# Patient Record
Sex: FEMALE | Race: WHITE
[De-identification: names, ages, dates, MRNs, and addresses within clinical notes are randomized per-mention and may not be internally consistent; named-entity substitution may affect disease eponyms.]

---

## 2017-06-20 ENCOUNTER — HOSPITAL ENCOUNTER (OUTPATIENT)
Dept: HOSPITAL 62 - END | Age: 72
Discharge: HOME | End: 2017-06-20
Attending: SURGERY
Payer: MEDICARE

## 2017-06-20 VITALS — SYSTOLIC BLOOD PRESSURE: 119 MMHG | DIASTOLIC BLOOD PRESSURE: 71 MMHG

## 2017-06-20 DIAGNOSIS — M54.81: ICD-10-CM

## 2017-06-20 DIAGNOSIS — E03.9: ICD-10-CM

## 2017-06-20 DIAGNOSIS — R10.11: ICD-10-CM

## 2017-06-20 DIAGNOSIS — M19.90: ICD-10-CM

## 2017-06-20 DIAGNOSIS — K44.9: ICD-10-CM

## 2017-06-20 DIAGNOSIS — Z79.899: ICD-10-CM

## 2017-06-20 DIAGNOSIS — K21.9: Primary | ICD-10-CM

## 2017-06-20 LAB
ALBUMIN SERPL-MCNC: 4.3 G/DL (ref 3.5–5)
ALP SERPL-CCNC: 106 U/L (ref 38–126)
ALT SERPL-CCNC: 33 U/L (ref 9–52)
ANION GAP SERPL CALC-SCNC: 11 MMOL/L (ref 5–19)
AST SERPL-CCNC: 22 U/L (ref 14–36)
BILIRUB DIRECT SERPL-MCNC: 0.3 MG/DL (ref 0–0.4)
BILIRUB SERPL-MCNC: 0.7 MG/DL (ref 0.2–1.3)
BUN SERPL-MCNC: 13 MG/DL (ref 7–20)
CALCIUM: 9.7 MG/DL (ref 8.4–10.2)
CHLORIDE SERPL-SCNC: 107 MMOL/L (ref 98–107)
CO2 SERPL-SCNC: 23 MMOL/L (ref 22–30)
CREAT SERPL-MCNC: 0.95 MG/DL (ref 0.52–1.25)
ERYTHROCYTE [DISTWIDTH] IN BLOOD BY AUTOMATED COUNT: 13.4 % (ref 11.5–14)
GLUCOSE SERPL-MCNC: 95 MG/DL (ref 75–110)
HCT VFR BLD CALC: 43.1 % (ref 36–47)
HGB BLD-MCNC: 13.7 G/DL (ref 12–15.5)
HGB HCT DIFFERENCE: -2
MCH RBC QN AUTO: 27.8 PG (ref 27–33.4)
MCHC RBC AUTO-ENTMCNC: 31.8 G/DL (ref 32–36)
MCV RBC AUTO: 87 FL (ref 80–97)
POTASSIUM SERPL-SCNC: 4.4 MMOL/L (ref 3.6–5)
PROT SERPL-MCNC: 7.2 G/DL (ref 6.3–8.2)
RBC # BLD AUTO: 4.94 10^6/UL (ref 3.72–5.28)
SODIUM SERPL-SCNC: 141 MMOL/L (ref 137–145)
WBC # BLD AUTO: 6.2 10^3/UL (ref 4–10.5)

## 2017-06-20 PROCEDURE — 36415 COLL VENOUS BLD VENIPUNCTURE: CPT

## 2017-06-20 PROCEDURE — 85027 COMPLETE CBC AUTOMATED: CPT

## 2017-06-20 PROCEDURE — 43235 EGD DIAGNOSTIC BRUSH WASH: CPT

## 2017-06-20 PROCEDURE — 0DJ08ZZ INSPECTION OF UPPER INTESTINAL TRACT, VIA NATURAL OR ARTIFICIAL OPENING ENDOSCOPIC: ICD-10-PCS | Performed by: SURGERY

## 2017-06-20 PROCEDURE — 80053 COMPREHEN METABOLIC PANEL: CPT

## 2017-06-20 RX ADMIN — MIDAZOLAM HYDROCHLORIDE ONE MG: 1 INJECTION, SOLUTION INTRAMUSCULAR; INTRAVENOUS at 10:55

## 2017-06-20 RX ADMIN — MIDAZOLAM HYDROCHLORIDE ONE MG: 1 INJECTION, SOLUTION INTRAMUSCULAR; INTRAVENOUS at 10:59

## 2017-06-20 NOTE — PDOC DISCHARGE SUMMARY
Discharge Summary (SDC)





- Discharge


Final Diagnosis: 


Abdominal pain.  Hiatal hernia


Date of Surgery: 06/20/17


Discharge Date: 06/20/17


Condition: Good


Treatment or Instructions: 


Esophagogastroduodenoscopy.  May discharge patient home when met discharge 

criteria.  Follow-up with me in 1-2 weeks.


Discharge Diet: As Tolerated


Discharge Activity: Activity As Tolerated


Report the Following to Your Physician Immediately: Unusual Bleeding

## 2017-06-20 NOTE — OPERATIVE REPORT
Operative Report


DATE OF SURGERY: 06/20/17


PREOPERATIVE DIAGNOSIS: Abdominal pain.  Gastroesophageal reflux disease.


POSTOPERATIVE DIAGNOSIS: Abdominal pain.  Hiatal hernia.


OPERATION: Esophagogastroduodenoscopy.


SURGEON: JOANN YOO


ANESTHESIA: Moderate Sedation


TISSUE REMOVED OR ALTERED: None


COMPLICATIONS: 


None


ESTIMATED BLOOD LOSS: None


INTRAOPERATIVE FINDINGS: Moderate sized hiatal hernia.  No ulcers no erosions 

no strictures


PROCEDURE: 


Informed consent was obtained.  Patient was brought to the endoscopy suite.  IV 

sedation with Versed and fentanyl was administered.  Endoscope was passed via 

the patient's mouth into the second portion of the duodenum.  Duodenum appeared 

to be normal.  The gastric mucosa appeared to be normal.  Patient had a 

moderate sized hiatal hernia.  No ulcers no erosions no masses were identified.

  The esophagus appeared normal.  Patient tolerated procedure well with no 

apparent complications.





Of note her preoperative CBC and compress the metabolic panel was unremarkable.

  I will see her back for follow-up to review her ultrasound report and 

discussed the management of her reflux symptoms.

## 2018-05-07 ENCOUNTER — HOSPITAL ENCOUNTER (OUTPATIENT)
Dept: HOSPITAL 62 - WI | Age: 73
End: 2018-05-07
Attending: INTERNAL MEDICINE
Payer: MEDICARE

## 2018-05-07 DIAGNOSIS — M81.0: ICD-10-CM

## 2018-05-07 DIAGNOSIS — Z12.31: Primary | ICD-10-CM

## 2018-05-07 PROCEDURE — 77067 SCR MAMMO BI INCL CAD: CPT

## 2018-05-07 PROCEDURE — 77080 DXA BONE DENSITY AXIAL: CPT

## 2018-05-07 PROCEDURE — 77063 BREAST TOMOSYNTHESIS BI: CPT

## 2018-05-09 NOTE — WOMENS IMAGING REPORT
EXAM DESCRIPTION:  3D SCREENING MAMMO BILAT



COMPLETED DATE/TIME:  5/7/2018 10:03 am



REASON FOR STUDY:  SCREENING MAMMO Z12.31  ENCNTR SCREEN MAMMOGRAM FOR MALIGNANT NEOPLASM OF ANDREA M81.
0  AGE-RELATED OSTEOPOROSIS W/O CURRENT PATHOLOGICAL FRAC



COMPARISON:  2015, 2016



TECHNIQUE:  Standard craniocaudal and mediolateral oblique views of each breast recorded using digita
l acquisition and breast tomosynthesis.



LIMITATIONS:  None.



FINDINGS:  No masses, calcifications or architectural distortion. No areas of suspicion.

Read with the assistance of CAD.

.[facility] - R2 Cenova Version 1.3

.[facility] - R2 Cenova Version 1.3

.[facility] - R2 Cenova Version 2.4

.[facility] - R2 Cenova Version 2.4

.[facility] - R2  Version 9.2



IMPRESSION:  NORMAL MAMMOGRAM.  BIRADS 1.



BREAST DENSITY:  a. The breasts are almost entirely fatty.



BIRAD:  1 NEGATIVE



RECOMMENDATION:  ROUTINE SCREENING



COMMENT:  The patient has been notified of the results by letter per MQSA requirements. Additional no
tification policies are in place for contacting patient with suspicious or incomplete findings.

Quality ID #225: The American College of Radiology recommends an annual screening mammogram for women
 aged 40 years or over. This facility utilizes a reminder system to ensure that all patients receive 
reminder letters, and/or direct phone calls for appointments. This includes reminders for routine scr
eening mammograms, diagnostic mammograms, or other Breast Imaging Interventions when appropriate.  Th
is patient will be placed in the appropriate reminder system.

The American College of Radiology (ACR) has developed recommendations for screening MRI of the breast
s in certain patient populations, to be used in conjunction with mammography.  Breast MRI surveillanc
e may be appropriate for women with more than 20% lifetime risk of developing breast cancer  as deter
mined by genetic testing, significant family history of the disease, or history of mantle radiation f
or Hodgkins Disease.  ACR Practice Guidelines 2008.

[employer]



PQRS 6045F: Fluoroscopic imaging is not utilized for breast tomosynthesis.



TECHNICAL DOCUMENTATION:  FINDING NUMBER: (1)

ASSESSMENT:  (1)

JOB ID:  [id number]

 2011 [employer]- All Rights Reserved



Reading location - IP/workstation name: [email or web address]

## 2018-05-09 NOTE — WOMENS IMAGING REPORT
EXAM DESCRIPTION:  BONE DENSITY HIP/SPINE



COMPLETED DATE/TIME:  5/7/2018 10:03 am



REASON FOR STUDY:  OSTEOPOROSIS Z12.31  ENCNTR SCREEN MAMMOGRAM FOR MALIGNANT NEOPLASM OF ANDREA M81.0  
AGE-RELATED OSTEOPOROSIS W/O CURRENT PATHOLOGICAL FRAC



COMPARISON:   4/29/2015



TECHNIQUE:  Dual-Energy X-ray Absorptiometry (DEXA) of the AP Spine and Hip.



LIMITATIONS:  None.



FINDINGS:  LUMBAR SPINE:

The bone mineral density (BMD) measured from L1-L4 in the AP projection correlates with a T-score of 
-0.9, previously -0.5, which is normal as defined by the World Health Organization.

HIP:

The bone mineral density (BMD) measured in the left hip correlates with a T-score of -1.7, previously
 -1.5, which is osteopenia as defined by the World Health Organization.



IMPRESSION:  1. LUMBAR SPINE: NORMAL.

2.  HIP: OSTEOPENIA.



COMMENT:  The World Health Organization defines low BMD as follows:

T-score:

Normal:  Greater than -1.0

Osteopenia: Between -1.0 and -2.5

Osteoporosis:  Less than -2.5 without fractures

Established osteoporosis:  Less than -2.5 with fractures

In general, you may wish to consider:

Diagnosis          Treatment                     Follow-up DEXA

Normal BMD      Prevention                    2-3 years

Osteopenia       Prevention/Therapy        1-2 years

Osteoporosis     Therapy                        Yearly



TECHNICAL DOCUMENTATION:  JOB ID:  [id number]

 2011 [employer]- All Rights Reserved



Reading location - IP/workstation name: [email or web address]

## 2018-06-19 ENCOUNTER — HOSPITAL ENCOUNTER (OUTPATIENT)
Dept: HOSPITAL 62 - SC | Age: 73
Discharge: HOME | End: 2018-06-19
Attending: OPHTHALMOLOGY
Payer: MEDICARE

## 2018-06-19 DIAGNOSIS — H25.13: Primary | ICD-10-CM

## 2018-06-19 DIAGNOSIS — M19.90: ICD-10-CM

## 2018-06-19 DIAGNOSIS — Z79.899: ICD-10-CM

## 2018-06-19 DIAGNOSIS — Z88.0: ICD-10-CM

## 2018-06-19 DIAGNOSIS — E03.9: ICD-10-CM

## 2018-06-19 DIAGNOSIS — H04.123: ICD-10-CM

## 2018-06-19 DIAGNOSIS — R53.1: ICD-10-CM

## 2018-06-19 DIAGNOSIS — Z88.8: ICD-10-CM

## 2018-06-19 DIAGNOSIS — K21.9: ICD-10-CM

## 2018-06-19 DIAGNOSIS — G89.29: ICD-10-CM

## 2018-06-19 DIAGNOSIS — H40.033: ICD-10-CM

## 2018-06-19 PROCEDURE — V2787 ASTIGMATISM-CORRECT FUNCTION: HCPCS

## 2018-06-19 PROCEDURE — 66984 XCAPSL CTRC RMVL W/O ECP: CPT

## 2018-06-19 RX ADMIN — KETOROLAC TROMETHAMINE PRN DROP: 4.5 SOLUTION/ DROPS OPHTHALMIC at 13:23

## 2018-06-19 RX ADMIN — TROPICAMIDE PRN DROP: 10 SOLUTION/ DROPS OPHTHALMIC at 11:57

## 2018-06-19 RX ADMIN — LIDOCAINE HYDROCHLORIDE PRN ML: 40 INJECTION, SOLUTION RETROBULBAR; TOPICAL at 00:00

## 2018-06-19 RX ADMIN — TROPICAMIDE PRN DROP: 10 SOLUTION/ DROPS OPHTHALMIC at 11:48

## 2018-06-19 RX ADMIN — BESIFLOXACIN PRN DROP: 6 SUSPENSION OPHTHALMIC at 12:39

## 2018-06-19 RX ADMIN — EPINEPHRINE ONE MG: 1 INJECTION, SOLUTION, CONCENTRATE INTRAVENOUS at 12:24

## 2018-06-19 RX ADMIN — BESIFLOXACIN PRN DROP: 6 SUSPENSION OPHTHALMIC at 11:57

## 2018-06-19 RX ADMIN — BUPIVACAINE HYDROCHLORIDE PRN ML: 7.5 INJECTION, SOLUTION EPIDURAL; RETROBULBAR at 12:13

## 2018-06-19 RX ADMIN — TETRACAINE HYDROCHLORIDE PRN DROP: 25 LIQUID OPHTHALMIC at 12:10

## 2018-06-19 RX ADMIN — HEPARIN SODIUM ONE ML: 1000 INJECTION, SOLUTION INTRAVENOUS; SUBCUTANEOUS at 00:00

## 2018-06-19 RX ADMIN — CYCLOPENTOLATE HYDROCHLORIDE AND PHENYLEPHRINE HYDROCHLORIDE PRN DROP: 2; 10 SOLUTION/ DROPS OPHTHALMIC at 12:08

## 2018-06-19 RX ADMIN — BESIFLOXACIN PRN DROP: 6 SUSPENSION OPHTHALMIC at 00:00

## 2018-06-19 RX ADMIN — BESIFLOXACIN PRN DROP: 6 SUSPENSION OPHTHALMIC at 11:49

## 2018-06-19 RX ADMIN — CYCLOPENTOLATE HYDROCHLORIDE AND PHENYLEPHRINE HYDROCHLORIDE PRN DROP: 2; 10 SOLUTION/ DROPS OPHTHALMIC at 11:57

## 2018-06-19 RX ADMIN — LIDOCAINE HYDROCHLORIDE PRN ML: 40 INJECTION, SOLUTION RETROBULBAR; TOPICAL at 12:13

## 2018-06-19 RX ADMIN — SODIUM CHONDROITIN SULFATE / SODIUM HYALURONATE ONE EACH: 0.55-0.5 INJECTION INTRAOCULAR at 12:24

## 2018-06-19 RX ADMIN — HEPARIN SODIUM ONE ML: 1000 INJECTION, SOLUTION INTRAVENOUS; SUBCUTANEOUS at 12:24

## 2018-06-19 RX ADMIN — TROPICAMIDE PRN DROP: 10 SOLUTION/ DROPS OPHTHALMIC at 12:08

## 2018-06-19 RX ADMIN — CYCLOPENTOLATE HYDROCHLORIDE AND PHENYLEPHRINE HYDROCHLORIDE PRN DROP: 2; 10 SOLUTION/ DROPS OPHTHALMIC at 11:48

## 2018-06-19 RX ADMIN — SODIUM CHONDROITIN SULFATE / SODIUM HYALURONATE ONE EACH: 0.55-0.5 INJECTION INTRAOCULAR at 00:00

## 2018-06-19 RX ADMIN — EPINEPHRINE ONE MG: 1 INJECTION, SOLUTION, CONCENTRATE INTRAVENOUS at 00:00

## 2018-06-19 RX ADMIN — TETRACAINE HYDROCHLORIDE PRN DROP: 25 LIQUID OPHTHALMIC at 11:49

## 2018-06-19 RX ADMIN — BUPIVACAINE HYDROCHLORIDE PRN ML: 7.5 INJECTION, SOLUTION EPIDURAL; RETROBULBAR at 12:10

## 2018-06-19 RX ADMIN — KETOROLAC TROMETHAMINE PRN DROP: 4.5 SOLUTION/ DROPS OPHTHALMIC at 11:48

## 2018-06-19 NOTE — [facility]GICARE DISCHARGE SUMMARY E
[facility] Discharge Summary



NAME: PRICE, NERY

                                      MRN: [id number]

                                AGE: 73Y

ADMITTED: 06/19/2018           DISCHARGED: 06/19/2018



HOSPITAL COURSE:

The patient is a 73-year-old lady who underwent uneventful cataract

extraction with toric intraocular lens implant, right eye, on 06/19/2018. 

She was discharged to home.  She was instructed to resume preoperative

medications, take Tylenol as needed for discomfort, to keep the eye

shielded, to use Besivance, Durezol and Ilevro at 3 p.m. and 8 p.m., and to

follow up in my office in 1 day.





DICTATING PHYSICIAN: GERMANIA TUCKER M.D.



5233M              DT: 06/19/2018 1721

PHY#: [id number]        DD: 06/19/2018 1242

ID:   [id number]               JOB#: [id number]       ACCT: [id number]



cc:GERMANIA TUCKER M.D.

>

## 2018-06-19 NOTE — SURGICARE OPERATIVE REPORT E
[facility] Operative Report



NAME: NERY FLORES

                                      MRN: [id number]

                             AGE: 73Y

DATE OF SURGERY: 06/19/2018                   ROOM:



PREOPERATIVE DIAGNOSIS:

CATARACT, RIGHT EYE.



POSTOPERATIVE DIAGNOSIS:

CATARACT, RIGHT EYE.



OPERATION:

Phacoemulsification with toric intraocular lens implant, right eye.



SURGEON:

GERMANIA TUCKER M.D.



ANESTHESIA:

Topical with MAC.



INDICATIONS FOR SURGERY:

Difficulty reading words on TV.  Best corrected visual acuity 20/40.



PROCEDURE:

Prior to the surgery, patient was placed in a seated position, and the 0,

180 and 270 degrees axis *------* was marked using the marking level. 

Prior to placing the lens implant, the 165 degree axis was marked on the

eye.  Lens was centered at this axis.



The patient was brought to the Operating Room and placed on the operative

table. Following tetracaine drops, topical anesthesia was administered.

This consisted of instrument wipe pledgets soaked in a solution of 4%

Xylocaine mixed with 0.75% Marcaine in a 1:2 ratio. A 2 x 1 cm pledget was

placed in the superior fornix. A 1 x 1 cm pledget was placed in the

inferior fornix. The eye was patched shut for 5 minutes. The patch was

removed. The eye was sterilely prepped and draped in the usual manner. Lid

speculum was placed in the eye. The pledgets were removed. 4-0 black silk

sutures were placed around the superior and the inferior rectus muscles to

be used as traction. A conjunctival peritomy was made at the 10 o'clock

position. Hemostasis was obtained with bipolar cautery. A posterior limbal

groove was created using a crescent knife and dissected anteriorly towards

the cornea. A sharp point blade was used to create a paracentesis site at

the 2 o'clock position. A 2.4 mm keratome was used to enter the anterior

chamber through the groove. Viscoelastic was injected into the anterior

chamber. An anterior capsulotomy was performed using Utrata forceps in a

capsulorrhexis fashion. Hydrodissection and hydrodelineation were

performed. Phacoemulsification was performed in divide-and-conquer

technique. A total of 5.86 CDE phaco time was used.

Following this, the I/A unit was used to remove residual cortex.

Viscoelastic was injected into the capsular bag. Intraocular lens model

[id number], 24.5 diopters, serial number [id number] was placed in the

capsular bag. The I/A unit was used to remove residual viscoelastic. The

wound was seen to be watertight under high and low pressure, and no sutures

were placed. The intraocular lens was well centered. The pressure was

adjusted in the eye to normal pressure. The 4-0 black silk sutures and lid

speculum were removed. The eye was shielded after Besivance drops were

placed. The patient tolerated the procedure well and was sent to the

Recovery Room in good condition.





DICTATING PHYSICIAN: GERMANIA TUCKER M.D.















DICTATING PHYSICIAN: GERMANIA TUCKER M.D.



5233M              DT: 06/19/2018 1716

PHY#: [id number]        DD: 06/19/2018 1242

ID:   [id number]               JOB#: [id number]       ACCT: [id number]



cc:GERMANIA TUCKER M.D.

>

## 2018-07-10 ENCOUNTER — HOSPITAL ENCOUNTER (OUTPATIENT)
Dept: HOSPITAL 62 - SC | Age: 73
Discharge: HOME | End: 2018-07-10
Attending: OPHTHALMOLOGY
Payer: MEDICARE

## 2018-07-10 DIAGNOSIS — H25.12: Primary | ICD-10-CM

## 2018-07-10 PROCEDURE — 66984 XCAPSL CTRC RMVL W/O ECP: CPT

## 2018-07-10 PROCEDURE — 08RK3JZ REPLACEMENT OF LEFT LENS WITH SYNTHETIC SUBSTITUTE, PERCUTANEOUS APPROACH: ICD-10-PCS | Performed by: OPHTHALMOLOGY

## 2018-07-10 PROCEDURE — V2632 POST CHMBR INTRAOCULAR LENS: HCPCS

## 2018-07-10 RX ADMIN — TETRACAINE HYDROCHLORIDE PRN DROP: 25 LIQUID OPHTHALMIC at 09:31

## 2018-07-10 RX ADMIN — BESIFLOXACIN PRN DROP: 6 SUSPENSION OPHTHALMIC at 00:00

## 2018-07-10 RX ADMIN — BESIFLOXACIN PRN DROP: 6 SUSPENSION OPHTHALMIC at 09:50

## 2018-07-10 RX ADMIN — TETRACAINE HYDROCHLORIDE PRN DROP: 25 LIQUID OPHTHALMIC at 09:50

## 2018-07-10 RX ADMIN — CYCLOPENTOLATE HYDROCHLORIDE AND PHENYLEPHRINE HYDROCHLORIDE PRN DROP: 2; 10 SOLUTION/ DROPS OPHTHALMIC at 09:50

## 2018-07-10 RX ADMIN — TROPICAMIDE PRN DROP: 10 SOLUTION/ DROPS OPHTHALMIC at 09:40

## 2018-07-10 RX ADMIN — BESIFLOXACIN PRN DROP: 6 SUSPENSION OPHTHALMIC at 09:30

## 2018-07-10 RX ADMIN — CYCLOPENTOLATE HYDROCHLORIDE AND PHENYLEPHRINE HYDROCHLORIDE PRN DROP: 2; 10 SOLUTION/ DROPS OPHTHALMIC at 09:30

## 2018-07-10 RX ADMIN — CYCLOPENTOLATE HYDROCHLORIDE AND PHENYLEPHRINE HYDROCHLORIDE PRN DROP: 2; 10 SOLUTION/ DROPS OPHTHALMIC at 09:40

## 2018-07-10 RX ADMIN — BESIFLOXACIN PRN DROP: 6 SUSPENSION OPHTHALMIC at 10:46

## 2018-07-10 RX ADMIN — TROPICAMIDE PRN DROP: 10 SOLUTION/ DROPS OPHTHALMIC at 09:50

## 2018-07-10 RX ADMIN — TROPICAMIDE PRN DROP: 10 SOLUTION/ DROPS OPHTHALMIC at 09:30

## 2018-07-10 NOTE — [facility]GICARE DISCHARGE SUMMARY E
[facility] Discharge Summary



NAME: PRICE, NERY

                                      MRN: [id number]

                                      AGE: 73Y

ADMITTED: 07/10/2018           DISCHARGED: 07/10/2018



HOSPITAL COURSE:

The patient is a 73-year-old lady who underwent uneventful cataract

extraction with intraocular lens implant, left eye, on 07/10/2018.  She

will be discharged to home.  She was instructed to resume preoperative

medications, to take Tylenol as needed for discomfort, to keep her eye

shielded, to use Besivance, Durezol, and Ilevro at 3:00 p.m. and 8:00 p.m.,

and to follow up in my office in 1 day.





DICTATING PHYSICIAN: GEMRANIA TUCKER M.D.



1819M              DT: 07/10/2018 1058

PHY#: [id number]        DD: 07/10/2018 1051

ID:   [id number]               JOB#: [id number]       ACCT: [id number]



cc:GERMANIA TUCKER M.D.

>

## 2018-07-10 NOTE — [facility]ARE OPERATIVE REPORT E
[facility] Operative Report



NAME: NERY FLORES

                                      MRN: [id number]

                                      AGE: 73Y

DATE OF SURGERY:  07/10/2018          ROOM:



PREOPERATIVE DIAGNOSIS:

Cataract, left eye.



POSTOPERATIVE DIAGNOSIS:

Cataract, left eye.



PROCEDURE PERFORMED:

Phacoemulsification with posterior chamber intraocular lens, left eye.



SURGEON:

GERMANIA TUCKER M.D.



ANESTHESIA:

Topical with MAC.



INDICATIONS FOR SURGERY:

Difficulty reading small print.



BEST CORRECTED VISUAL ACUITY:

20/40.



DESCRIPTION OF PROCEDURE:

The patient was brought to the operating room and placed on the operative

table.  Following tetracaine drops, topical anesthesia was administered.

This consisted of instrument wipe pledgets soaked in a solution of 4%

Xylocaine mixed with 0.75% Marcaine in a 1:2 ratio.  A 2 x 1 cm pledget was

placed in the superior fornix.  A 1 x 1 cm pledget was placed in the

inferior fornix.  The eye was patched shut for 5 minutes.  The patch was

removed.  The eye was sterilely prepped and draped in the usual manner. 

Lid speculum was placed in the eye.  The pledgets were removed, 4-0 black

silk sutures were placed around the superior and the inferior rectus

muscles to be used as traction.  A conjunctival peritomy was made at the 10

o'clock position.  Hemostasis was obtained with bipolar cautery.  A

posterior limbal groove was created using a crescent knife and dissected

anteriorly towards the cornea.  A sharp point blade was used to create a

paracentesis site at the 2 o'clock position.  A 2.4 mm keratome was used to

enter the anterior chamber through the groove.  Viscoelastic was injected

into the anterior chamber.  An anterior capsulotomy was performed using

Utrata forceps in a capsulorrhexis fashion.  Hydrodissection and

hydrodelineation were performed.  Phacoemulsification was performed in

divide-and-conquer technique.  Total phaco time, 1.06 minutes.



Following this, the I/A unit was used to remove residual cortex. 

Viscoelastic was injected into the capsular bag. Intraocular lens Model

[id number], 22.5 diopters, serial number [id number] was placed in the

capsular bag.  The I/A unit was used to remove residual viscoelastic.  The

wound was seen to be watertight under high and low pressure, and no sutures

were placed.  The intraocular lens was well centered. The pressure was

adjusted in the eye to normal pressure.  The 4-0 black silk sutures and lid

speculum were removed.  The eye was shielded after Besivance drops were

placed. The patient tolerated the procedure well and was sent to the

recovery room in good condition.





DICTATING PHYSICIAN: GERMANIA TUCKER M.D.



1819M              DT: 07/10/2018 1052

PHY#: [id number]        DD: 07/10/2018 1051

ID:   [id number]               JOB#: [id number]       ACCT: [id number]



cc:GERMANIA TUCKER M.D.

>

## 2018-11-04 ENCOUNTER — HOSPITAL ENCOUNTER (EMERGENCY)
Dept: HOSPITAL 62 - ER | Age: 73
Discharge: HOME | End: 2018-11-04
Payer: MEDICARE

## 2018-11-04 VITALS — SYSTOLIC BLOOD PRESSURE: 138 MMHG | DIASTOLIC BLOOD PRESSURE: 80 MMHG

## 2018-11-04 DIAGNOSIS — E03.9: ICD-10-CM

## 2018-11-04 DIAGNOSIS — Z88.0: ICD-10-CM

## 2018-11-04 DIAGNOSIS — M79.605: Primary | ICD-10-CM

## 2018-11-04 DIAGNOSIS — Z90.710: ICD-10-CM

## 2018-11-04 LAB
ADD MANUAL DIFF: NO
ALBUMIN SERPL-MCNC: 4.2 G/DL (ref 3.5–5)
ALP SERPL-CCNC: 92 U/L (ref 38–126)
ALT SERPL-CCNC: 24 U/L (ref 9–52)
ANION GAP SERPL CALC-SCNC: 13 MMOL/L (ref 5–19)
AST SERPL-CCNC: 16 U/L (ref 14–36)
BASOPHILS # BLD AUTO: 0 10^3/UL (ref 0–0.2)
BASOPHILS NFR BLD AUTO: 0.1 % (ref 0–2)
BILIRUB DIRECT SERPL-MCNC: 0.2 MG/DL (ref 0–0.4)
BILIRUB SERPL-MCNC: 0.7 MG/DL (ref 0.2–1.3)
BUN SERPL-MCNC: 28 MG/DL (ref 7–20)
CALCIUM: 9.8 MG/DL (ref 8.4–10.2)
CHLORIDE SERPL-SCNC: 103 MMOL/L (ref 98–107)
CO2 SERPL-SCNC: 25 MMOL/L (ref 22–30)
EOSINOPHIL # BLD AUTO: 0.2 10^3/UL (ref 0–0.6)
EOSINOPHIL NFR BLD AUTO: 1.7 % (ref 0–6)
ERYTHROCYTE [DISTWIDTH] IN BLOOD BY AUTOMATED COUNT: 13.8 % (ref 11.5–14)
GLUCOSE SERPL-MCNC: 85 MG/DL (ref 75–110)
HCT VFR BLD CALC: 43.2 % (ref 36–47)
HGB BLD-MCNC: 14.8 G/DL (ref 12–15.5)
LYMPHOCYTES # BLD AUTO: 2.8 10^3/UL (ref 0.5–4.7)
LYMPHOCYTES NFR BLD AUTO: 23 % (ref 13–45)
MCH RBC QN AUTO: 28.3 PG (ref 27–33.4)
MCHC RBC AUTO-ENTMCNC: 34.3 G/DL (ref 32–36)
MCV RBC AUTO: 83 FL (ref 80–97)
MONOCYTES # BLD AUTO: 1.6 10^3/UL (ref 0.1–1.4)
MONOCYTES NFR BLD AUTO: 12.8 % (ref 3–13)
NEUTROPHILS # BLD AUTO: 7.7 10^3/UL (ref 1.7–8.2)
NEUTS SEG NFR BLD AUTO: 62.4 % (ref 42–78)
PLATELET # BLD: 154 10^3/UL (ref 150–450)
POTASSIUM SERPL-SCNC: 4.6 MMOL/L (ref 3.6–5)
PROT SERPL-MCNC: 7 G/DL (ref 6.3–8.2)
RBC # BLD AUTO: 5.23 10^6/UL (ref 3.72–5.28)
SODIUM SERPL-SCNC: 141.4 MMOL/L (ref 137–145)
TOTAL CELLS COUNTED % (AUTO): 100 %
WBC # BLD AUTO: 12.3 10^3/UL (ref 4–10.5)

## 2018-11-04 PROCEDURE — 93926 LOWER EXTREMITY STUDY: CPT

## 2018-11-04 PROCEDURE — 36415 COLL VENOUS BLD VENIPUNCTURE: CPT

## 2018-11-04 PROCEDURE — 99284 EMERGENCY DEPT VISIT MOD MDM: CPT

## 2018-11-04 PROCEDURE — 80053 COMPREHEN METABOLIC PANEL: CPT

## 2018-11-04 PROCEDURE — 85025 COMPLETE CBC W/AUTO DIFF WBC: CPT

## 2018-11-04 PROCEDURE — 93971 EXTREMITY STUDY: CPT

## 2018-11-04 NOTE — ER DOCUMENT REPORT
ED Extremity Problem, Lower





- General


Chief Complaint: Leg Pain


Stated Complaint: LEFT LEG PAIN


Time Seen by Provider: 11/04/18 10:16


Mode of Arrival: Wheelchair


Notes: 





Patient is complaining of pain in her left posterior lower leg since Friday.  

It feels tight and swollen and is painful to stand and walk on it.  She recalls 

no unusual activity or injury to cause this pain.  Has never had it before.  

Denies any chest pains or shortness of breath or difficulty breathing.  Has 

never had blood clots.








TRAVEL OUTSIDE OF THE [place] IN LAST 30 DAYS: No





- Related Data


Allergies/Adverse Reactions: 


 





citalopram Allergy (Intermediate, Verified 11/04/18 09:29)


 ITCHING AND TREMORS


Penicillins Allergy (Mild, Verified 11/04/18 09:29)


 Hives











Past Medical History





- General


Information source: Patient





- Social History


Smoking Status: Never Smoker


Family History: Reviewed & Not Pertinent


Patient has suicidal ideation: No


Patient has homicidal ideation: No





- Past Medical History


Cardiac Medical History: 


   Denies: Hx Coronary Artery Disease, Hx Heart Attack, Hx Hypertension


Neurological Medical History: Reports: Other - Patient has occipital neuralgia 

on muscle relaxers and hs been on prednison.  Denies: Hx Cerebrovascular 

Accident, Hx Seizures


Endocrine Medical History: Reports: Hx Hypothyroidism


GI Medical History: Reports: Hx Gastroesophageal Reflux Disease


Musculoskeletal Medical History: Reports Hx Arthritis - Back, Lt foot, Hands/

trigger fingers


Infectious Medical History: Denies: Hx Hepatitis


Past Surgical History: Reports: Hx Appendectomy, Hx Herniorrhaphy, Hx 

Hysterectomy, Hx Orthopedic Surgery - R knee, Hx Tonsillectomy, Hx Tubal 

Ligation





- Immunizations


Hx Diphtheria, Pertussis, Tetanus Vaccination: Yes


Hx Pneumococcal Vaccination: 05/03/16





Review of Systems





- Review of Systems


Notes: 





REVIEW OF SYSTEMS:





CONSTITUTIONAL :  Denies fever.


  


EENT:   Denies eye, ear, nose or mouth or throat pain or other symptoms.





CARDIOVASCULAR:  Denies chest pain.





RESPIRATORY:  Denies cough, chest congestion, or shortness of breath.





GASTROINTESTINAL:  Denies abdominal pain or nausea, vomiting, or diarrhea.





GENITOURINARY:  Denies difficulty or painful urinating, urinary frequency, 

blood in urine.





MUSCULOSKELETAL:  Denies back or neck pain.  Denies joint pain or swelling.  

See HPI.





SKIN:   Denies rash or skin lesions.





NEUROLOGICAL:  Denies LOC or altered mental status.  Denies headache.  Denies 

sensory loss or motor deficits.





ALL OTHER SYSTEMS REVIEWED AND NEGATIVE.





Physical Exam





- Vital signs


Vitals: 


 











Temp Pulse Resp BP Pulse Ox


 


 97.5 F   77   20   138/83 H  97 


 


 11/04/18 09:39  11/04/18 09:39  11/04/18 09:39  11/04/18 09:39  11/04/18 09:39











Interpretation: Normal


Notes: 





PHYSICAL EXAMINATION:





GENERAL: Well-appearing, in no acute distress.





HEAD: Atraumatic, normocephalic.





EYES: Pupils equal round and reactive to light, extraocular movements intact.





ENT: oropharynx clear without exudates.  Moist mucous membranes.





NECK: Normal range of motion, supple.





LUNGS: Breath sounds clear and equal bilaterally.





HEART: Regular rate and rhythm without murmurs.





ABDOMEN: Soft, nontender.  No guarding or rebound.  No masses.





BACK:  No tenderness throughout entire back.





EXTREMITIES: Patient is tender in the posterior left lower leg from just above 

the Achilles insertion to the popliteal fossa.  It feels to be a muscular 

tenderness that is soft and not hard and does not feel like a "cord" like a 

blood vessel with clot in it feels.  There is no erythema or warmth 

differential between the 2 legs.  Homans maneuver is actually not positive.





NEUROLOGICAL:  Normal speech, normal gait.  Normal sensory, motor, and reflex 

exams.  Awake, alert, and oriented x3.  Cranial nerves normal.





PSYCH: Normal mood, normal affect.





SKIN: Warm, dry, no rashes.





Course





- Re-evaluation


Re-evalutation: 





11/04/18 19:50


Patient reevaluated and informed of the results of her Doppler study.  I do not 

have an alternative explanation for the pain although on examination, it seems 

to be more of a muscular pain than a vascular pain from my experience.  Advised 

her to follow-up with Dr. Palmer in 2-3 days if she is not showing any 

improvement.


11/04/18 19:52








- Vital Signs


Vital signs: 


 











Temp Pulse Resp BP Pulse Ox


 


 98.2 F   70   16   138/80 H  96 


 


 11/04/18 12:55  11/04/18 12:55  11/04/18 12:55  11/04/18 12:55  11/04/18 12:55














- Laboratory


Result Diagrams: 


 11/04/18 10:41





 11/04/18 10:41


Laboratory results interpreted by me: 


 











  11/04/18 11/04/18





  10:41 10:41


 


WBC  12.3 H 


 


Absolute Monocytes  1.6 H 


 


BUN   28 H


 


Est GFR (Non-Af Amer)   53 L














- Diagnostic Test


Radiology reviewed: Image reviewed, Reports reviewed - Patient's venous Doppler 

study was negative.





Discharge





- Discharge


Clinical Impression: 


 Pain in left lower leg, Muscle strain





Condition: Stable


Disposition: HOME, SELF-CARE


Additional Instructions: 


Leg Pain, Nonspecific


     We did not find an obvious cause for your leg pain. There's no sign of 

blood clot, infection, or other serious disease.


     Possible causes of vague leg pain include muscle or joint inflammation, 

disc disease in the lower back, pressure on the nerves in the back, or reduced 

blood flow through the arteries of the leg.


     Rest the leg. Pain can be eased with an antiinflammatory pain medicine 

such as ibuprofen. If the pain involves a small area, a heating pad might help. 


     Call the doctor or return if the leg becomes swollen, weak, discolored, or 

increasingly painful, or if you develop any other significant change in your 

health.





NORMAL EXAM AND WORKUP:


     At this time, your examination and workup show no significant abnormality.

  No significant abnormal physical findings were noted.  All laboratory, EKG, 

and imaging (x-ray, CT scans, ultrasound) studies that were ordered show no 

significant abnormality.


     Although your examination and all studies that were ordered showed no 

significant abnormal finding, there are no examinations and no studies that are 

100% accurate.  There is always the possibility that some abnormality could 

exist and not be detected with physical examination or within the limits and 

capabilities of laboratory and other studies.


     You should return or follow up as you were instructed on your visit today 

for further evaluation if your symptoms do not resolve.








USE OF ACETAMINOPHEN (Tylenol):


     Acetaminophen may be taken for pain relief or fever control. It's much 

safer than aspirin, offering a wider range of "safe" dosages.  It is safe 

during pregnancy.  Some brand names are Tylenol, Panadol, Datril, Anacin 3, 

Tempra, and Liquiprin. Acetaminophen can be repeated every four hours.  The 

following are maximum recommended dosages:





WEIGHT         Dose             Drops                  Elixir        Chewable(

80mg)


(LBS.)                            drprs=droppers    tsp=teaspoon





>89 pounds or adults          650 mg to 900 mg





Acetaminophen can be repeated every four hours.  Maximum dose not to exceed 

4000 mg a day.





   These maximum recommended dosages are slightly higher than the dosages 

written on the product container, but these dosages are very safe and below the 

toxic dosage for acetaminophen.











You may also take a small amount of ibuprofen/Motrin, such as 400 mg once or 

twice a day for anti-inflammatory effect.








FOLLOW-UP CARE:


If you have been referred to a physician for follow-up care, call the physician

s office for an appointment as you were instructed or within the next two days.

  If you experience worsening or a significant change in your symptoms, notify 

the physician immediately or return to the Emergency Department at any time for 

re-evaluation.








Call Dr. Gibson office tomorrow to schedule a follow-up appointment for him to 

recheck your leg and your blood pressure in a couple of days in the office.








Referrals: 


LEANN GIBSON MD [Primary Care Provider] - Follow up as needed

## 2018-11-04 NOTE — RADIOLOGY REPORT (SQ)
EXAM DESCRIPTION:  ARTERIAL LOWER EXTREM UNILAT



COMPLETED DATE/TIME:  11/4/2018 12:16 pm



REASON FOR STUDY:  left lower extremity pain



COMPARISON:  None.



TECHNIQUE:  Dynamic and static gray scale and color images acquired of the left lower extremity arter
ies. Additional selected spectral images recorded.



LIMITATIONS:  None.



FINDINGS:  LEFT LEG:

INFLOW ARTERIES: Normal, no obstruction evident.

FEMORAL ARTERIES:Triphasic waveforms. Normal, no velocity elevation to suggest focal stenosis. Normal
 color Doppler evaluation.  No aneurysm.

POPLITEAL ARTERY:Triphasic waveforms. Normal, no velocity elevation to suggest focal stenosis. Normal
 color Doppler evaluation.  No aneurysm.

PATENT TIBIOPERONEAL TRUNK AND 3 VESSEL RUNOFF: Yes, normal vessels.

OTHER: No other significant finding.



IMPRESSION:  NORMAL LEFT LOWER EXTREMITY ARTERIAL DOPPLER.



COMMENT:  [facility]

NORMAL: Greater than 1.0

MINIMAL DISEASE: 0.9 to 1.0

CLAUDICATION: 0.5 to 0.9

SEVERE ARTERIAL DISEASE:  Less than 0.5

[facility]

NORMAL: Greater than 1.0 (1.2 If Heavy Calcifications)

NORMAL TO MILD ISCHEMIA: 0.8 to 1.0

MODERATE ISCHEMIA: 0.4 to 0.8

SEVERE ISCHEMIA:  Less than 0.4



TECHNICAL DOCUMENTATION:  JOB ID:  [id number]

 2011 [employer]- All Rights Reserved



Reading location - IP/workstation name: [email or web address]

## 2018-11-04 NOTE — ER DOCUMENT REPORT
ED Medical Screen (RME)





- General


Chief Complaint: Leg Pain


Stated Complaint: LEFT LEG PAIN


Time Seen by Provider: 11/04/18 10:16


Mode of Arrival: Wheelchair


Information source: Patient


Notes: 





73-year-old female with a history of hypothyroidism, occipital neuralgia who 

presents to the emergency room with left lower extremity pain since Friday (2 

days).  Patient denies chest pain or shortness of breath.  Patient states she 

does have calf pain and tenderness.  She denies any significant swelling.


TRAVEL OUTSIDE OF THE [place] IN LAST 30 DAYS: No





- Related Data


Allergies/Adverse Reactions: 


 





citalopram Allergy (Intermediate, Verified 11/04/18 09:29)


 ITCHING AND TREMORS


Penicillins Allergy (Mild, Verified 11/04/18 09:29)


 Hives











Past Medical History





- Past Medical History


Cardiac Medical History: 


   Denies: Hx Coronary Artery Disease, Hx Heart Attack, Hx Hypertension


Pulmonary Medical History: 


   Denies: Hx Asthma, Hx Bronchitis, Hx COPD, Hx Pneumonia


Neurological Medical History: Denies: Hx Cerebrovascular Accident, Hx Seizures


Endocrine Medical History: Reports: Hx Hypothyroidism


Renal/ Medical History: Denies: Hx Peritoneal Dialysis


GI Medical History: Reports: Hx Gastroesophageal Reflux Disease.  Denies: Hx 

Hepatitis, Hx Hiatal Hernia, Hx Ulcer


Musculoskeltal Medical History: Reports Hx Arthritis - Back, Lt foot, Hands/

trigger fingers


Infectious Medical History: Denies: Hx Hepatitis


Past Surgical History: Reports: Hx Appendectomy, Hx Herniorrhaphy, Hx 

Hysterectomy, Hx Orthopedic Surgery - R knee, Hx Tonsillectomy, Hx Tubal 

Ligation.  Denies: Hx Mastectomy, Hx Open Heart Surgery, Hx Pacemaker





- Immunizations


Hx Diphtheria, Pertussis, Tetanus Vaccination: Yes





Physical Exam





- Vital signs


Vitals: 





 











Temp Pulse Resp BP Pulse Ox


 


 97.5 F   77   20   138/83 H  97 


 


 11/04/18 09:39  11/04/18 09:39  11/04/18 09:39  11/04/18 09:39  11/04/18 09:39














Course





- Vital Signs


Vital signs: 





 











Temp Pulse Resp BP Pulse Ox


 


 97.5 F   77   20   138/83 H  97 


 


 11/04/18 09:39  11/04/18 09:39  11/04/18 09:39  11/04/18 09:39  11/04/18 09:39














Doctor's Discharge





- Discharge


Referrals: 


LEANN QURESHI MD [Primary Care Provider] - Follow up as needed

## 2018-11-04 NOTE — RADIOLOGY REPORT (SQ)
EXAM DESCRIPTION:  VENOUS UNILATERAL LOWER



COMPLETED DATE/TIME:  11/4/2018 12:16 pm



REASON FOR STUDY:  lle pain



COMPARISON:  None.



TECHNIQUE:  Dynamic and static gray scale and color images acquired of the left leg venous system. Se
lected spectral images acquired with additional compression and augmentation maneuvers. The contralat
eral common femoral vein and saphenofemoral junction were also imaged. Images stored on PACS.



LIMITATIONS:  None.



FINDINGS:  COMMON FEMORAL: Normal phasicity, compression and augmentation. No visualized echogenic ma
terial on gray scale. No defects on color images.

FEMORAL: Normal compression and augmentation. No visualized echogenic material on gray scale. No defe
cts on color images.

POPLITEAL: Normal compression, augmentation. No visualized echogenic material on gray scale. No defec
ts on color images.

CALF VESSELS: Normal compression, augmentation. No visualized echogenic material on gray scale. No de
fects on color images.

GSV and SSV: Normal compression, augmentation. No visualized echogenic material on gray scale. No def
ects on color images.

ANY DEEP VENOUS INSUFFICIENCY: No.

ANY EVIDENCE OF POPLITEAL CYST: No.

OTHER: No other significant finding.

CONTRALATERAL COMMON FEMORAL VEIN AND SAPHENOFEMORAL JUNCTION:

Normal phasicity, compression and augmentation. No visualized echogenic material on gray scale. No de
fects on color images.



IMPRESSION:  NO EVIDENCE DVT OR SVT IN THE LEFT LEG.



TECHNICAL DOCUMENTATION:  JOB ID:  [id number]

 2011 [employer]- All Rights Reserved



Reading location - IP/workstation name: [email or web address]

## 2019-10-02 ENCOUNTER — HOSPITAL ENCOUNTER (OUTPATIENT)
Dept: HOSPITAL 62 - WI | Age: 74
End: 2019-10-02
Attending: PHYSICIAN ASSISTANT
Payer: MEDICARE

## 2019-10-02 DIAGNOSIS — Z12.31: Primary | ICD-10-CM

## 2019-10-02 PROCEDURE — 77063 BREAST TOMOSYNTHESIS BI: CPT

## 2019-10-02 PROCEDURE — 77067 SCR MAMMO BI INCL CAD: CPT

## 2019-10-02 NOTE — WOMENS IMAGING REPORT
EXAM DESCRIPTION:  3D SCREENING MAMMO BILAT



COMPLETED DATE/TIME:  10/2/2019 11:15 am



REASON FOR STUDY:  Z12.31 ENCOUNTER FOR SCREENING MAMMOGRAM FOR MALIGNANT NEOPLASM OF BREAST Z12.31  
ENCNTR SCREEN MAMMOGRAM FOR MALIGNANT NEOPLASM OF ANDREA



COMPARISON:  2015 through 2018.



EXAM PARAMETERS:  Views: Standard craniocaudal and mediolateral oblique views of each breast recorded
 using digital acquisition and breast tomosynthesis.

Read with the assistance of CAD.

.[facility] - Livescribe  Version 9.2



LIMITATIONS:  None.



FINDINGS:  No suspicious masses, suspicious calcifications or architectural distortion. No areas of c
oncern.



IMPRESSION:   NEGATIVE MAMMOGRAM. BIRADS 1.



BREAST DENSITY:  b. There are scattered areas of fibroglandular density.



BIRAD:  ASSESSMENT:  1 NEGATIVE



RECOMMENDATION:  ROUTINE SCREENING



COMMENT:  The patient has been notified of the results by letter per MQSA requirements. Additional no
tification policies are in place for contacting patient with suspicious or incomplete findings.

Quality ID #225: The American College of Radiology recommends an annual screening mammogram for women
 aged 40 years or over. This facility utilizes a reminder system to ensure that all patients receive 
reminder letters, and/or direct phone calls for appointments. This includes reminders for routine scr
eening mammograms, diagnostic mammograms, or other Breast Imaging Interventions when appropriate.  Th
is patient will be placed in the appropriate reminder system.



TECHNICAL DOCUMENTATION:  FINDING NUMBER: (1)

ASSESSMENT:  (1)

JOB ID:  [id number]

 2011 [employer]- All Rights Reserved



Reading location - IP/workstation name: [email or web address]

## 2020-03-26 NOTE — RADIOLOGY REPORT (SQ)
EXAM DESCRIPTION:  CT ABD/PELVIS WITH IV   ORAL



COMPLETED DATE/TIME:  3/26/2020 3:27 pm



REASON FOR STUDY:  LLQ pain and vomiting



COMPARISON:  None.



TECHNIQUE:  CT scan of the abdomen and pelvis performed using helical scanning technique with dynamic
 intravenous contrast injection.  No oral contrast. Images reviewed with lung, soft tissue, and bone 
windows. Reconstructed coronal and sagittal MPR images reviewed. Delayed images for evaluation of the
 urinary system also acquired. All images stored on PACS.

All CT scanners at this facility use dose modulation, iterative reconstruction, and/or weight based d
osing when appropriate to reduce radiation dose to as low as reasonably achievable (ALARA).

[facility]: Dose Right  [facility]: CareDose    [facility]: Dose Right    [facility]: Teradose 4D    [facility]: Smart Technologies



CONTRAST TYPE AND DOSE:  contrast/concentration: Isovue 350.00 mg/ml; Total Contrast Delivered: 83.0 
ml; Total Saline Delivered: 38.0 ml



RENAL FUNCTION:  BUN 20, creatinine 1.0



RADIATION DOSE:  CT Rad equipment meets quality standard of care and radiation dose reduction techniq
ues were employed. CTDIvol: 9.2 - 12.8 mGy. DLP: 1245 mGy-cm..



LIMITATIONS:  None.



FINDINGS:  LOWER CHEST: No significant findings. No nodules or infiltrates.  There is a small hiatal 
hernia.

LIVER: Normal size. No masses.  No dilated ducts.

SPLEEN: Normal size. No focal lesions.

PANCREAS: No masses. No significant calcifications. No adjacent inflammation or peripancreatic fluid 
collections. Pancreatic duct not dilated.

GALLBLADDER: Surgically absent.

ADRENAL GLANDS: No significant masses or asymmetry.

RIGHT KIDNEY AND URETER: No solid masses.   No significant calcifications.   No hydronephrosis or hyd
roureter.

LEFT KIDNEY AND URETER: No solid masses.   No significant calcifications.   No hydronephrosis or hydr
oureter.

AORTA AND VESSELS: No aneurysm. No dissection. Renal arteries, SMA, celiac without stenosis.

RETROPERITONEUM: No retroperitoneal adenopathy, hemorrhage or masses.

BOWEL AND PERITONEAL CAVITY: Thickening of the descending colonic wall consistent with colitis.  This
 could be infectious or inflammatory.  Ischemic colitis is thought to be less likely.  Celiac axis an
d SMA are widely patent.  LUCIANO is patent as well.  No focal abscess.

APPENDIX: Not visualized.

PELVIS: No mass.  No free fluid. Normal bladder.

ABDOMINAL WALL: No masses. No hernias.

BONES: No significant or acute findings.

OTHER: No other significant finding.



IMPRESSION:  Thickening of the descending colonic wall consistent with colitis.  Most likely infectio
us or inflammatory.  No focal abscess or fluid collection.



TECHNICAL DOCUMENTATION:  JOB ID:  [id number]

Quality ID # 436: Final reports with documentation of one or more dose reduction techniques (e.g., Au
tomated exposure control, adjustment of the mA and/or kV according to patient size, use of iterative 
reconstruction technique)

 2011 [employer]- All Rights Reserved



Reading location - IP/workstation name: [email or web address]

## 2020-03-26 NOTE — ER DOCUMENT REPORT
ED General





- General


Chief Complaint: Nausea


Stated Complaint: NAUSEA


Time Seen by Provider: 03/26/20 12:02


Primary Care Provider: 


RAFAEL HARTMAN PA-C [Primary Care Provider] - Follow up as needed


TRAVEL OUTSIDE OF THE [place] IN LAST 30 DAYS: No





- HPI


Notes: 





Chief complaint: Left upper and left lower quadrant abdominal pain with 

associated nausea





75-year-old female states she was sitting in a recliner at home around 830 this 

morning when she suddenly had severe burning pain in left upper and left lower 

quadrant of abdomen associated with a wave of nausea and then she had a s

ensation that something "popped" and she had a burning sensation that spread 

over her entire abdomen.  She described pain level as 8/10 at that time.  Since 

then the pain is gotten somewhat better and is presently around 3/10.  She had 

persistent nausea and vomited once at home.  EMS transported her here and 

administered IV Zofran during transport.  Her nausea is greatly improved.





Denies fever, chills, dysuria.  Denies diarrhea.  Has not recently been treated 

with antibiotics for any condition.





Past surgery has included bilateral tubal ligation as sounds like patient had 

surgical complications with intestinal injury at that time and a partial bowel 

resection.  She is subsequently had a cholecystectomy and appendectomy and has 

had a history of recurrent episodes of abdominal pain attributed to adhesions.





- Related Data


Allergies/Adverse Reactions: 


                                        





citalopram Allergy (Intermediate, Verified 11/04/18 09:29)


   ITCHING AND TREMORS


Penicillins Allergy (Mild, Verified 11/04/18 09:29)


   Hives











Past Medical History





- General


Information source: Patient





- Social History


Smoking Status: Never Smoker


Frequency of alcohol use: Rare


Drug Abuse: None


Lives with: Family


Family History: Reviewed & Not Pertinent





- Medical History


Medical History: Other - Diagnosis of DVT left lower extremity last week and pat

ient was started on Eliquis





- Past Medical History


Cardiac Medical History: 


   Denies: Hx Coronary Artery Disease, Hx Heart Attack, Hx Hypertension


Pulmonary Medical History: 


   Denies: Hx Asthma, Hx Bronchitis, Hx COPD, Hx Pneumonia


Neurological Medical History: Denies: Hx Cerebrovascular Accident, Hx Seizures


Endocrine Medical History: Reports: Hx Hypothyroidism.  Denies: Hx Diabetes 

Mellitus Type 1, Hx Diabetes Mellitus Type 2


Renal/ Medical History: Denies: Hx Peritoneal Dialysis


Malignancy Medical History: Reports: None


GI Medical History: Reports: Hx Gastroesophageal Reflux Disease.  Denies: Hx Hep

atitis, Hx Hiatal Hernia, Hx Ulcer


Musculoskeletal Medical History: Reports Hx Arthritis - Back, Lt foot, 

Hands/trigger fingers


Infectious Medical History: Denies: Hx Hepatitis


Past Surgical History: Reports: Hx Appendectomy, Hx Herniorrhaphy, Hx 

Hysterectomy, Hx Orthopedic Surgery - R knee, Hx Tonsillectomy, Hx Tubal Ligat

ion.  Denies: Hx Mastectomy, Hx Open Heart Surgery, Hx Pacemaker





- Immunizations


Hx Diphtheria, Pertussis, Tetanus Vaccination: Yes


Hx Pneumococcal Vaccination: 05/03/16





Review of Systems





- Review of Systems


Notes: 





Constitutional: Negative for fever.


HENT: Negative for sore throat.


Eyes: Negative for visual changes.


Cardiovascular: Negative for chest pain.


Respiratory: Negative for shortness of breath.


Gastrointestinal: As per HPI.


Genitourinary: Negative for dysuria.


Musculoskeletal: Negative for back pain.


Skin: Negative for rash.


Neurological: Negative for headaches, weakness or numbness.





10 point ROS negative except as marked above and in HPI.








Physical Exam





- Vital signs


Vitals: 


                                        











Resp Pulse Ox


 


 23 H  97 


 


 03/26/20 12:10  03/26/20 12:10














- Notes


Notes: 











GENERAL: Slender elderly female appearing in no acute distress.





SKIN: Good turgor no rashes.





HEAD: Normocephalic atraumatic.





EYES: PERRLA.  EOMI.  Conjunctivae and sclerae clear.





EARS: CANALS AND TMS CLEAR.





NOSE: CLEAR.





MOUTH: Moist mucosa.  Good dentition.  No stridor or edema.  No drooling.





NECK: Supple.  No masses or thyromegaly.  No adenopathy.  Carotids 2+ without b

ruits.  No JVD.





BACK: Symmetrical without tenderness.





CHEST: Respirations unlabored.  Breath sounds clear and symmetrical.





HEART: Regular rhythm.  No murmur gallop or rub.





ABDOMEN: Mild tenderness left upper and left lower quadrant.  Multiple healed 

surgical scars.  Soft without masses, organomegaly or rebound.  Bowel sounds 

normally active.  No bruits.





GENITALIA: Deferred.





EXTREMITIES: No edema.  No calf tenderness.  Cap refill less than 1.5 seconds.  

Dorsalis pedis and posterior tibial pulses 3+ and symmetrical.





NEUROLOGICAL: GCS 15.  Alert and oriented x3.  Normal gait.  Fluent speech.  

Cranial nerves II through XII intact.  Sensorimotor and cerebellar normal.  

Normal tone.





PSYCHIATRIC: Appropriate affect.





Course





- Re-evaluation


Re-evalutation: 





03/26/20 12:13


Clinically this lady does not look like a perforated hollow viscus although this

would have to be considered given her history.  Possibility of a diverticulitis 

with perforation would be entertained.  Other considerations would include 

ureterolithiasis with renal colic.





Patient is to remain n.p.o. with IV hydration.  She is declined analgesics at t

his time.  CBC, urinalysis and comprehensive metabolic profile ordered.  CT 

abdomen pelvis with IV and oral contrast ordered.


03/26/20 18:37


CT showed some localized thickening in the descending colon consistent with 

colitis.  There is no obvious perforation or abscess.  Patient is 

symptomatically improved with IV analgesics and hydration.  I have given her a 

dose of metronidazole IV.  She is penicillin allergic.  I think she can safely 

be managed on outpatient basis and follow-up with her primary care physician.





- Vital Signs


Vital signs: 


                                        











Temp Pulse Resp BP Pulse Ox


 


       21 H  128/84 H  95 


 


       03/26/20 16:00  03/26/20 15:01  03/26/20 16:00














- Laboratory


Result Diagrams: 


                                 03/26/20 12:00





                                 03/26/20 12:00


Laboratory results interpreted by me: 


                                        











  03/26/20 03/26/20





  12:00 12:33


 


Sodium  136.6 L 


 


BUN  22 H 


 


Est GFR (MDRD) Non-Af  53 L 


 


Glucose  129 H 


 


Urine Ketones   TRACE H


 


Urine Urobilinogen   4.0 H


 


Leukocyte Esterase Rfl   TRACE H














Discharge





- Discharge


Clinical Impression: 


 Acute colitis





Condition: Stable


Disposition: HOME, SELF-CARE


Additional Instructions: 


Colitis, Nonspecific





     Colitis is an inflammatory disease of the large intestine which affects the

lining of the bowel.  The cause is uncertain, though it is often caused by an 

infection.  In some cases, the symptoms resolve and can return again in the 

future.


     Colitis is characterized by abdominal pain, often nausea and vomiting, and 

either diarrhea or difficulty with bowel movements.  Sometimes blood will be 

present in the bowel movements.  Fever is often present as well.


     Milder cases of colitis can be managed as an outpatient with medications 

for nausea and vomiting and pain, oral fluid therapy, and perhaps antibiotics, 

if a bacterial origin is suspected.  Antidiarrhea medicine should usually be 

avoided in colitis.


     If you have increasing abdominal pain, repeated vomiting, fever, rectal 

bleeding, or worsening diarrhea, you should return for re-evaluation.





Multiple prescription medications have been provided for you.





Follow-up with your primary care physician within the next 2 to 3 days.





Return here as needed for new or worsening symptoms:





Pain that is worsening or unimproved


Uncontrolled vomiting


High fever or shaking chills


Overall worsening


Prescriptions: 


Promethazine HCl [Phenergan 25 mg Tablet] 25 mg PO Q4HP PRN #12 tablet


 PRN Reason: 


Tramadol HCl [Ultram 50 mg Tablet] 50 mg PO Q4HP PRN #12 tab


 PRN Reason: 


Ciprofloxacin HCl [Cipro 500 mg Tablet] 500 mg PO BID #20 tablet


Metronidazole [Flagyl 500 mg Tablet] 500 mg PO Q6H #40 tablet


Referrals: 


RAFAEL HARTMAN PA-C [Primary Care Provider] - Follow up as needed

## 2020-09-03 NOTE — RADIOLOGY REPORT (SQ)
EXAM DESCRIPTION: 



XR CHEST 1 VIEW



COMPLETED DATE/TME:  09/03/2020 21:06



CLINICAL HISTORY: 



75 years, Female, epigastric pain



COMPARISON:

1/31/2013 chest



NUMBER OF VIEWS:

1



TECHNIQUE:

Portable chest



LIMITATIONS:

None.



FINDINGS:



The heart size is normal. Elevation of the right hemidiaphragm.

Lungs clear. No pneumothorax



IMPRESSION:



No acute cardiopulmonary process

 



copyright 2011 [employer]- All Rights Reserved

## 2020-09-03 NOTE — RADIOLOGY REPORT (SQ)
EXAM DESCRIPTION: 



US ABDOMEN DOPPLER LIMITED



COMPLETED DATE/TME:  09/03/2020 21:06



CLINICAL HISTORY: 



75 years, Female, RUQ pain/vomiting



COMPARISON:

CT 3/26/2020, ultrasound 7/8/2016



TECHNIQUE:

Limited right upper quadrant ultrasound



LIMITATIONS:

None.



FINDINGS:



The liver is homogenous in echotexture without focal lesion.

Status post cholecystectomy. The CBD measures 4.1 mm. The

visualized pancreas, abdominal aorta, inferior vena cava, right

kidney are unremarkable. No ascites



IMPRESSION:



Status post cholecystectomy. Remainder unremarkable

 



copyright 2011 [employer]- All Rights Reserved

## 2020-09-03 NOTE — ER DOCUMENT REPORT
ED Medical Screen (RME)





- General


Chief Complaint: Vomiting


Stated Complaint: VOMITING


Time Seen by Provider: 09/03/20 20:55


Primary Care Provider: 


RAFAEL HARTMAN PA-C [Primary Care Provider] - Follow up as needed


Mode of Arrival: Wheelchair


Information source: Patient


Notes: 





75-year-old female patient presented to the emergency department chief complaint

of epigastric pain and vomiting.  Patient reports symptoms started after she was

eating lunch today at [employer].  She believes that she has a piece of food

possibly stuck in her esophagus.  She states that the time she was eating turkey

and dressing.  She reports she is now having severe epigastric pain that 

radiates through to her right upper quadrant and back.  She has a history of a 

prior cholecystectomy.  She states this feels similar to when she had her 

gallbladder taken out.





Patient speaking in full and complete sentences, swallowing her saliva without 

difficulty, she states anytime she swallows even a sip of water she vomits.





She declines any nausea medications, states last time she was here she had an 

allergic reaction to a nausea medication, she cannot remember the name.





I have greeted and performed a rapid initial assessment of this patient.  A 

comprehensive ED assessment and evaluation of the patient, analysis of test 

results and completion of the medical decision making process will be conducted 

by additional ED providers.  I have specifically instructed the patient or 

family members with the patient to immediately return to any nursing staff 

should anything change in the patient's condition or with their chief complaint.





TRAVEL OUTSIDE OF THE [place] IN LAST 30 DAYS: No





- Related Data


Allergies/Adverse Reactions: 


                                        





citalopram Allergy (Intermediate, Verified 11/04/18 09:29)


   ITCHING AND TREMORS


Penicillins Allergy (Mild, Verified 11/04/18 09:29)


   Hives











Past Medical History





- Social History


Frequency of alcohol use: Rare


Drug Abuse: None





- Past Medical History


Cardiac Medical History: 


   Denies: Hx Coronary Artery Disease, Hx Heart Attack, Hx Hypertension


Pulmonary Medical History: 


   Denies: Hx Asthma, Hx Bronchitis, Hx COPD, Hx Pneumonia


Neurological Medical History: Denies: Hx Cerebrovascular Accident, Hx Seizures


Endocrine Medical History: Reports: Hx Hypothyroidism.  Denies: Hx Diabetes 

Mellitus Type 1, Hx Diabetes Mellitus Type 2


Renal/ Medical History: Denies: Hx Peritoneal Dialysis


GI Medical History: Reports: Hx Gastroesophageal Reflux Disease.  Denies: Hx 

Hepatitis, Hx Hiatal Hernia, Hx Ulcer


Musculoskeltal Medical History: Reports Hx Arthritis - Back, Lt foot, 

Hands/trigger fingers


Infectious Medical History: Denies: Hx Hepatitis


Past Surgical History: Reports: Hx Appendectomy, Hx Herniorrhaphy, Hx 

Hysterectomy, Hx Orthopedic Surgery - R knee, Hx Tonsillectomy, Hx Tubal 

Ligation.  Denies: Hx Mastectomy, Hx Open Heart Surgery, Hx Pacemaker





- Immunizations


Hx Diphtheria, Pertussis, Tetanus Vaccination: Yes





Physical Exam





- Vital signs


Vitals: 





                                        











Temp Pulse Resp BP Pulse Ox


 


 98.7 F   98   16   143/72 H  98 


 


 09/03/20 20:47  09/03/20 20:47  09/03/20 20:47  09/03/20 20:47  09/03/20 20:47














Course





- Vital Signs


Vital signs: 





                                        











Temp Pulse Resp BP Pulse Ox


 


 98.7 F   98   16   143/72 H  98 


 


 09/03/20 20:47  09/03/20 20:47  09/03/20 20:47  09/03/20 20:47  09/03/20 20:47














Doctor's Discharge





- Discharge


Referrals: 


RAFAEL HARTMAN PA-C [Primary Care Provider] - Follow up as needed

## 2020-09-04 NOTE — ER DOCUMENT REPORT
ED Medical Screen (RME)





- General


Chief Complaint: Abdominal Pain


Stated Complaint: ABDOMINAL PAIN,LEFT ARM PAIN


Time Seen by Provider: 09/04/20 12:11


Primary Care Provider: 


BA SELF NP [Emergency Provider] - Follow up as needed


Mode of Arrival: Wheelchair


Information source: Patient


Notes: 





75-year-old female presented to ED for sharp tearing pain to her chest and back.

 She states yesterday she was out eating when some ice got stuck in her throat 

and started going up and down and then she started violently from throwing up.  

She states she would choke and gag on the ice and then she started having this 

sharp tearing pain through her chest and back.  She states that when she could 

not get seen and had been here for several hours she went home and took her 

oxycodone and tizanidine so that she can sleep.  She called her primary care 

doctor and he told her she needed to come right back to the hospital to get her 

aorta checked out.  Patient is alert oriented respirations regular nonlabored 

speaking in full sentences.














I have greeted and performed a rapid initial assessment of this patient.  A 

comprehensive ED assessment and evaluation of the patient, analysis of test 

results and completion of medical decision making process will be conducted by 

an additional ED providers.


TRAVEL OUTSIDE OF THE [place] IN LAST 30 DAYS: No





- Related Data


Allergies/Adverse Reactions: 


                                        





citalopram Allergy (Intermediate, Verified 11/04/18 09:29)


   ITCHING AND TREMORS


Penicillins Allergy (Mild, Verified 11/04/18 09:29)


   Hives











Past Medical History





- Past Medical History


Cardiac Medical History: 


   Denies: Hx Coronary Artery Disease, Hx Heart Attack, Hx Hypertension


Pulmonary Medical History: 


   Denies: Hx Asthma, Hx Bronchitis, Hx COPD, Hx Pneumonia


Neurological Medical History: Denies: Hx Cerebrovascular Accident, Hx Seizures


Endocrine Medical History: Reports: Hx Hypothyroidism.  Denies: Hx Diabetes 

Mellitus Type 1, Hx Diabetes Mellitus Type 2


Renal/ Medical History: Denies: Hx Peritoneal Dialysis


GI Medical History: Reports: Hx Gastroesophageal Reflux Disease.  Denies: Hx 

Hepatitis, Hx Hiatal Hernia, Hx Ulcer


Musculoskeltal Medical History: Reports Hx Arthritis - Back, Lt foot, 

Hands/trigger fingers


Infectious Medical History: Denies: Hx Hepatitis


Past Surgical History: Reports: Hx Appendectomy, Hx Herniorrhaphy, Hx 

Hysterectomy, Hx Orthopedic Surgery - R knee, Hx Tonsillectomy, Hx Tubal 

Ligation.  Denies: Hx Mastectomy, Hx Open Heart Surgery, Hx Pacemaker





- Immunizations


Hx Diphtheria, Pertussis, Tetanus Vaccination: Yes





Physical Exam





- Vital signs


Vitals: 





                                        











Temp Pulse Resp BP Pulse Ox


 


 98.7 F   100   20   109/71   98 


 


 09/04/20 11:42  09/04/20 11:42  09/04/20 11:42  09/04/20 11:42  09/04/20 11:42














Course





- Vital Signs


Vital signs: 





                                        











Temp Pulse Resp BP Pulse Ox


 


 98.7 F   100   20   109/71   98 


 


 09/04/20 11:42  09/04/20 11:42  09/04/20 11:42  09/04/20 11:42  09/04/20 11:42














Doctor's Discharge





- Discharge


Referrals: 


RAFAEL HARTMAN PA-C [Primary Care Provider] - Follow up as needed

## 2020-09-04 NOTE — ER DOCUMENT REPORT
ED GI/





- General


Chief Complaint: Abdominal Pain


Stated Complaint: ABDOMINAL PAIN,LEFT ARM PAIN


Time Seen by Provider: 20 12:11


Primary Care Provider: 


BA SELF NP [NURSE PRACTITIONER] - Follow up as needed


Mode of Arrival: Wheelchair


Notes: 





75-year-old woman who began having difficulties yesterday when she went out to 

eat.  States that after tasting and number of items at the local restaurant she 

began to feel as though food was stuck in her esophagus.  She was able to force 

the food back up and then of course had a vomiting episode.  She then had a 

second episode of vomiting and severe pain that ripped through her left side and

into her back.  She came to the hospital, however decided to leave after 

waiting.  She contacted her doctor today and after discussion was told that she 

needed to come to the hospital and to be evaluated for possible aortic related 

issue.  Patient rates her pain 8-10 and she denies nausea at this time.


TRAVEL OUTSIDE OF THE [place] IN LAST 30 DAYS: No





- Related Data


Allergies/Adverse Reactions: 


                                        





citalopram Allergy (Intermediate, Verified 18 09:29)


   ITCHING AND TREMORS


Penicillins Allergy (Mild, Verified 18 09:29)


   Hives











Past Medical History





- General


Information source: Patient





- Social History


Smoking Status: Unknown if Ever Smoked


Family History: Reviewed & Not Pertinent


Patient has homicidal ideation: No





- Past Medical History


Cardiac Medical History: 


   Denies: Hx Coronary Artery Disease, Hx Heart Attack, Hx Hypertension


Pulmonary Medical History: 


   Denies: Hx Asthma, Hx Bronchitis, Hx COPD, Hx Pneumonia


Neurological Medical History: Denies: Hx Cerebrovascular Accident, Hx Seizures


Endocrine Medical History: Reports: Hx Hypothyroidism.  Denies: Hx Diabetes 

Mellitus Type 1, Hx Diabetes Mellitus Type 2


Renal/ Medical History: Denies: Hx Peritoneal Dialysis


GI Medical History: Reports: Hx Gastroesophageal Reflux Disease.  Denies: Hx 

Hepatitis, Hx Hiatal Hernia, Hx Ulcer


Musculoskeletal Medical History: Reports Hx Arthritis - Back, Lt foot, 

Hands/trigger fingers


Infectious Medical History: Denies: Hx Hepatitis


Past Surgical History: Reports: Hx Appendectomy, Hx Herniorrhaphy, Hx 

Hysterectomy, Hx Orthopedic Surgery - R knee, Hx Tonsillectomy, Hx Tubal 

Ligation.  Denies: Hx Mastectomy, Hx Open Heart Surgery, Hx Pacemaker





- Immunizations


Hx Diphtheria, Pertussis, Tetanus Vaccination: Yes


Hx Pneumococcal Vaccination: 16





Review of Systems





- Review of Systems


Notes: 





Constitutional: Negative for fever.


HENT: Negative for sore throat.


Eyes: Negative for visual changes.


Cardiovascular: Negative for chest pain.


Respiratory: Negative for shortness of breath.


Gastrointestinal: + Tenderness in the left upper quadrant


Genitourinary: Negative for dysuria.


Musculoskeletal: + Back pain


Skin: Negative for rash.


Neurological: Negative for headaches, weakness or numbness.





10 point ROS negative except as marked above and in HPI.





Physical Exam





- Vital signs


Vitals: 


                                        











Temp Pulse Resp BP Pulse Ox


 


 98.7 F   100   20   109/71   98 


 


 20 11:42  20 11:42  20 11:42  20 11:42  20 11:42














- Notes


Notes: 





PHYSICAL EXAMINATION:


 


Physical Exam:


General: Well-nourished well-developed 75-year-old woman in no mild distress


HEENT: NC/AT, pupils equal round and reactive to light, MM moist,nares clear, 

oropharynx clear, airway patent


Neck: supple, no adenopathy, no masses.  Good range of motion


Lungs: clear, no wheezing, no rales no rhonchi


CVS: Regular rate and rhythm no murmur gallop or rub


Abdomen: Soft, active, tenderness in the left lateral abdominal region, no 

masses, no hepatosplenomegaly


Ext:   No edema, clubbing or cyanosis.


Neuro: Alert and responsive, moving all 4 extremities on command, cranial nerves

intact, no focal findings


Skin: Intact no open lesions, no rash


PSYCH: Normal mood, normal affect.


 








Course





- Re-evaluation


Re-evalutation: 





20 18:48


Patient was given her pain is for the much better, scan of her chest CTA is 

negative for acute findings in the vascular system, patient states she is ready 

to go home.  She is being discharged home with tramadol for pain, I will also 

send a prescription for Zofran for nausea.  I encouraged her to follow-up with 

her primary care doctor next week or if her symptoms are worsening or she has 

other concerns she may return to the emergency department for further evaluation

and treatment








HOME CARE INSTRUCTIONS & INFORMATION:  Thank you for choosing us for your 

medical needs. We hope you're satisfied with the care you received.  After you 

leave, you must properly care for your problem and, at the same time, observe 

its progress.  Any condition can change.  Some illnesses can change rapidly over

hours or days.  If your condition worsens, return to the Emergency Department or

see your physician promptly.





ABOUT YOUR X-RAYS AND EKG'S:   If you had an EKG or X-rays taken, they have been

read by the Emergency Physician. The X-rays and EKG's will also be read by a 

Radiologist or Cardiologist within 24 hours.  If discrepancies are noted, you 

will be notified by telephone.  Please be certain the ED has a correct telephone

number & address where you can be reached.  Also, realize that some fractures or

abnormalities do not show up on initial X-rays.  If your symptoms continue, see 

your physician.





ABOUT YOUR LABORATORY TEST:   If you had laboratory tests, the results have been

reviewed by the Emergency Physician.  Some test results (for example cultures) 

may not be available for several days.  You will be contacted if any test result

shows you need additional treatment.  Please be certain the ED has a correct 

telephone number and address where you can be reached.





ABOUT YOUR MEDICATIONS:  You will receive instructions on how to take your 

medicine on the prescription label you receive.  Additional information may be 

provided by the Pharmacy.  If you have questions afterwards, call the ED for 

clarification or further instructions.  Some prescribed medications may cause 

drowsiness.  Do not perform tasks such as driving a car or operating machinery 

without consulting your Pharmacist.  If you feel you need a refill of pain 

medication, your condition will need re-evaluation.  Please do not call for a 

refill of any medication.





ABOUT YOUR SIGNATURE:   Signature of this document acknowledges to followin. Understanding that you received emergency treatment and that you may be 

released before al medical problems are known or treated. Please be certain   

the ED has a correct phone number & address where you can be reached.


   2. Acknowledgement that you will arrange for follow-up care as recommended.


   3. Authorization for the Emergency Physician to provide information to your 

follow-up Physician in order to maximize your care.





AT ANY TIME, IF YOUR SYMPTOMS CHANGE SIGNIFICANTLY OR WORSEN OR YOU DEVELOP NEW 

SYMPTOMS, RETURN TO THE EMERGENCY DEPARTMENT IMMEDIATELY FOR RE-EVALUATION.





OUR GOAL IS TO PROVIDE EXCELLENT MEDICAL CARE!





WE HOPE THAT WE HAVE MET YOUR EXPECTATIONS DURING YOUR EMERGENCY DEPARTMENT 

VISIT AND THAT YOU FEEL YOU HAVE RECEIVED EXCELLENT CARE!














- Vital Signs


Vital signs: 


                                        











Temp Pulse Resp BP Pulse Ox


 


 98 F   81   17   107/58 L  92 


 


 20 18:12  20 18:12  20 18:12  20 18:12  20 18:12














- Laboratory


Result Diagrams: 


                                 20 12:13





                                 20 12:13


Laboratory results interpreted by me: 


                                        











  20





  12:13 12:13 16:38


 


WBC  13.4 H  


 


Lymph % (Auto)  10.4 L  


 


Absolute Neuts (auto)  10.8 H  


 


Seg Neutrophils %  80.8 H  


 


Est GFR (MDRD) Non-Af   56 L 


 


Glucose   111 H 


 


Lipase   17.1 L 


 


Urine Urobilinogen    4.0 H











20 17:09


I have reviewed laboratory data and used this information for the treatment 

decisions regarding the patient.





Discharge





- Discharge


Clinical Impression: 


 Epigastric pain





Back pain


Qualifiers:


 Back pain location: back pain in unspecified location Chronicity: unspecified 

Back pain laterality: unspecified Qualified Code(s): M54.9 - Dorsalgia, 

unspecified





Vomiting


Qualifiers:


 Vomiting type: unspecified Vomiting Intractability: unspecified Nausea p

resence: unspecified Qualified Code(s): R11.10 - Vomiting, unspecified





Condition: Good


Disposition: HOME, SELF-CARE


Instructions:  Vomiting ([facility])


Additional Instructions: 


You are seen in the emergency department today for evaluation of a possible 

vascular emergency.  The CT scan which was performed reveals a normal aorta and 

vascular structure.  You are being discharged home with medication for pain and 

nausea.  Please take these medications as prescribed and follow-up with your 

primary care doctor as needed.





Your symptoms are worsening or if you have other concerns you may return to the 

hospital for further evaluation and treatment.








HOME CARE INSTRUCTIONS & INFORMATION:  Thank you for choosing us for your 

medical needs. We hope you're satisfied with the care you received.  After you 

leave, you must properly care for your problem and, at the same time, observe 

its progress.  Any condition can change.  Some illnesses can change rapidly over

hours or days.  If your condition worsens, return to the Emergency Department or

see your physician promptly.





ABOUT YOUR X-RAYS AND EKG'S:   If you had an EKG or X-rays taken, they have been

read by the Emergency Physician. The X-rays and EKG's will also be read by a 

Radiologist or Cardiologist within 24 hours.  If discrepancies are noted, you 

will be notified by telephone.  Please be certain the ED has a correct telephone

number & address where you can be reached.  Also, realize that some fractures or

abnormalities do not show up on initial X-rays.  If your symptoms continue, see 

your physician.





ABOUT YOUR LABORATORY TEST:   If you had laboratory tests, the results have been

reviewed by the Emergency Physician.  Some test results (for example cultures) 

may not be available for several days.  You will be contacted if any test result

shows you need additional treatment.  Please be certain the ED has a correct 

telephone number and address where you can be reached.





ABOUT YOUR MEDICATIONS:  You will receive instructions on how to take your 

medicine on the prescription label you receive.  Additional information may be 

provided by the Pharmacy.  If you have questions afterwards, call the ED for 

clarification or further instructions.  Some prescribed medications may cause 

drowsiness.  Do not perform tasks such as driving a car or operating machinery 

without consulting your Pharmacist.  If you feel you need a refill of pain 

medication, your condition will need re-evaluation.  Please do not call for a 

refill of any medication.





ABOUT YOUR SIGNATURE:   Signature of this document acknowledges to followin. Understanding that you received emergency treatment and that you may be re

leased before al medical problems are known or treated. Please be certain   the 

ED has a correct phone number & address where you can be reached.


   2. Acknowledgement that you will arrange for follow-up care as recommended.


   3. Authorization for the Emergency Physician to provide information to your 

follow-up Physician in order to maximize your care.





AT ANY TIME, IF YOUR SYMPTOMS CHANGE SIGNIFICANTLY OR WORSEN OR YOU DEVELOP NEW 

SYMPTOMS, RETURN TO THE EMERGENCY DEPARTMENT IMMEDIATELY FOR RE-EVALUATION.





OUR GOAL IS TO PROVIDE EXCELLENT MEDICAL CARE!





WE HOPE THAT WE HAVE MET YOUR EXPECTATIONS DURING YOUR EMERGENCY DEPARTMENT 

VISIT AND THAT YOU FEEL YOU HAVE RECEIVED EXCELLENT CARE!











Prescriptions: 


Tramadol HCl [Ultram] 50 mg PO PRN PRN #10 tablet


 PRN Reason: For Pain


Ondansetron [Zofran Odt 4 mg Tablet] 1 - 2 tab PO Q4H PRN #15 tab.rapdis


 PRN Reason: For Nausea/Vomiting


Referrals: 


BA SELF NP [NURSE PRACTITIONER] - Follow up as needed

## 2020-09-04 NOTE — RADIOLOGY REPORT (SQ)
CT CHEST ANGIOGRAPHY WITHOUT THEN WITH IV CONTRAST



HISTORY: Severe chest and back pain..



COMPARISON: None.



TECHNIQUE: CT angiogram of the chest with IV contrast. 3-D MIP

images were obtained in coronal and sagittal reconstructions.

This exam was performed according to our departmental

dose-optimization program, which includes automated exposure

control, adjustment of the mA and/or kV according to patient size

and/or use of iterative reconstruction technique. 



FINDINGS:



No filling defects are identified in the pulmonary trunk, main

left and right pulmonary arteries, or the segmental branches.



The thyroid gland is normal. No mediastinal or hilar adenopathy.

The heart size is normal without pericardial effusion. The

thoracic aorta is normal caliber without evidence of dissection

or aneurysm. There is a small left pleural effusion with adjacent

atelectasis and small area of consolidation. There is also mild

atelectasis of the right lung base.



The visualized upper abdomen demonstrates a small hiatal hernia.

No acute osseous findings are seen.



IMPRESSION:



1.  No aortic dissection or acute pulmonary embolism.

2.  Small left pleural effusion with adjacent

atelectasis/consolidation.

## 2020-10-13 NOTE — WOMENS IMAGING REPORT
EXAM DESCRIPTION:  3D SCREENING MAMMO BILAT



IMAGES COMPLETED DATE/TIME:  10/13/2020 10:04 am



REASON FOR STUDY:  Z12.31 Z12.31 ENCOUNTER FOR SCREENING MAMMOGRAM FOR MALIGNANT NEOPLASM OF B Z12.31
  ENCNTR SCREEN MAMMOGRAM FOR MALIGNANT NEOPLASM OF ANDREA



COMPARISON:  Multiple since 2015



EXAM PARAMETERS:  Views: Standard craniocaudal and mediolateral oblique views of each breast recorded
 using digital acquisition and breast tomosynthesis.

Read with the assistance of CAD.

.Former[facility]  Version 9.2



LIMITATIONS:  None.



FINDINGS:  No suspicious masses, suspicious calcifications or architectural distortion. No areas of c
oncern.



IMPRESSION:   NEGATIVE MAMMOGRAM. BIRADS 1.



BREAST DENSITY:  b. There are scattered areas of fibroglandular density.



BIRAD:  ASSESSMENT:  1 NEGATIVE



RECOMMENDATION:  ROUTINE SCREENING

Please continue yearly bilateral screening mammography/tomosynthesis in October 2021



COMMENT:  The patient has been notified of the results by letter per [facility] requirements. Additional no
tification policies are in place for contacting patient with suspicious or incomplete findings.

Quality ID #225: The American College of Radiology recommends an annual screening mammogram for women
 aged 40 years or over. This facility utilizes a reminder system to ensure that all patients receive 
reminder letters, and/or direct phone calls for appointments. This includes reminders for routine scr
eening mammograms, diagnostic mammograms, or other Breast Imaging Interventions when appropriate.  Th
is patient will be placed in the appropriate reminder system.



TECHNICAL DOCUMENTATION:  FINDING NUMBER: (1)

ASSESSMENT:  (1)

JOB ID:  [id number]

 2011 [employer]- All Rights Reserved



Reading location - IP/workstation name: [email or web address]

## 2020-10-20 NOTE — RADIOLOGY REPORT (SQ)
EXAM DESCRIPTION:  VENOUS UNILATERAL LOWER



IMAGES COMPLETED DATE/TIME:  10/19/2020 4:01 pm



REASON FOR STUDY:  PAIN/SWELLING I82.402  ACUTE EMBOLISM AND THOMBOS UNSP DEEP VEINS OF L LOW



COMPARISON:  None.  The patient reportedly had a study 6 months ago which was positive for deep venou
s thrombosis but the study was done elsewhere and those images are unavailable.



TECHNIQUE:  Dynamic and static gray scale and color images acquired of the left leg venous system. Se
lected spectral images acquired with additional compression and augmentation maneuvers. The contralat
eral common femoral vein and saphenofemoral junction were also imaged. Images stored on PACS.



LIMITATIONS:  None.



FINDINGS:  COMMON FEMORAL: Normal phasicity, compression and augmentation. No visualized echogenic ma
terial on gray scale. No defects on color images.

FEMORAL: Thrombus present in the mid femoral vein.  Partially compressible in the proximal and distal
 portions

POPLITEAL: Normal compression, augmentation. No visualized echogenic material on gray scale. No defec
ts on color images.

CALF VESSELS: Normal compression, augmentation. No visualized echogenic material on gray scale. No de
fects on color images.

GSV and SSV: Normal compression, augmentation. No visualized echogenic material on gray scale. No def
ects on color images.

ANY DEEP VENOUS INSUFFICIENCY: Not evaluated.

ANY EVIDENCE OF POPLITEAL CYST: No.

OTHER: No other significant finding.

CONTRALATERAL COMMON FEMORAL VEIN AND SAPHENOFEMORAL JUNCTION:

Normal phasicity, compression and augmentation. No visualized echogenic material on gray scale. No de
fects on color images.



IMPRESSION:  THROMBUS IN THE FEMORAL VEIN.  PARTIALLY OCCLUSIVE IN THE PROXIMAL AND DISTAL PORTION.  
THE PRIOR STUDY WAS DONE ELSEWHERE AND CANNOT ASSESS FOR ANY INTERVAL CHANGE.



TECHNICAL DOCUMENTATION:  JOB ID:  [id number]

 2011 [employer]- All Rights Reserved



Reading location - IP/workstation name: [email or web address]